# Patient Record
Sex: FEMALE | Race: WHITE | Employment: FULL TIME | URBAN - METROPOLITAN AREA
[De-identification: names, ages, dates, MRNs, and addresses within clinical notes are randomized per-mention and may not be internally consistent; named-entity substitution may affect disease eponyms.]

---

## 2022-04-25 DIAGNOSIS — M27.1 CENTRAL GIANT CELL REPARATIVE GRANULOMA OF JAW: Primary | ICD-10-CM

## 2022-04-25 DIAGNOSIS — Z11.59 ENCOUNTER FOR SCREENING FOR OTHER VIRAL DISEASES: Primary | ICD-10-CM

## 2022-05-02 DIAGNOSIS — M27.1 GIANT CELL GRANULOMA, CENTRAL: Primary | ICD-10-CM

## 2022-05-02 PROCEDURE — 99207 E-CONSULT TO ALLERGY & IMMUNOLOGY (ADULT OUTPT PROVIDER TO SPECIALIST WRITTEN QUESTION & RESPONSE): CPT | Performed by: ALLERGY & IMMUNOLOGY

## 2022-05-02 NOTE — H&P
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 Reports history of increasing swelling and hypoesthesia since her incisional biopsy on 4/1/2022. She notices increased mobility of her mandibular anterior teeth. Denies any other constitutional symptoms.    PMH:  - HLD  - Concern for JACKIE    PAST SURGICAL HISTORY:  - Salpingectomy 2018 for ectopic pregnancy  - Third molars extracted with sedation by outside dentist ~6 months ago  - No personal or family complications with general anesthesia    MEDICATIONS:  - Women's multivitamin  - Fish oil    ALLERGIES:  - Amoxicillin: rash as a baby  - Metals: Patient states that she develops rashes and swelling if she wears jewelry that isn't satinder metal. She reports that her grandmother has severe metal allergies, resulting in the need for a specialized implant for a hip arthroplasty.    SOCIAL HISTORY:  - Denies tobacco use.  - 3-5 alcoholic drinks/week.    REVIEW OF SYSTEMS:  ROS reviewed and negative aside from listed in HPI.    Physical activity tolerance > 4 METS. The patient is active, frequently runs ~3 miles.    VITALS: T:98.1  HR: 75  BP: 131/78  SPO2: 98%   H: 5'3\" (160.2 cm)  W: 160 lbs (72.6 kg)    PHYSICAL EXAM:  GEN: WD/WN female, NAD  HEENT: NC/AT, EOMI, PERRL. Distension of soft tissue overlying right anterior mandible, overlying skin is of normal temperature and color. Inferior border of mandible is intact. Right CNV-3 hypoesthesia extending from right lip commissure to midline. Neck soft with no palpable lymph nodes. Right mandibular anterior vestibule is firmly distended from midline to second premolar; non-tender; covered by normal-appearing mucosa. " Teeth #25 and #26 have class I mobility. LUTHER >45mm, OP clear, uvula midline, large pharyngeal tonsils (Aram III), fair oral hygiene, intact adult dentition. Floor of mouth is non-distended, no erythema/ulcerations/pigmentations/exophytic lesions.  CV: Warm and well-perfused, RRR, no murmurs/rubs/gallops.  PULM: Breathing comfortably on room air, CTAB, no rales/rhonchi/wheezing  GI: soft, ND/NT  MSK: SKINNER, no peripheral extremity edema  Neuro: AAOx4, CN II-XII intact bilaterally with exception of right CN V-3 hypoesthesia.    Mallampati I and normal neck mobility observed.    IMAGING:  OPG dated 2/17/2022 - imaging independently reviewed  Intact adult dentition, condyles seated in the glenoid fossae bilaterally, maxillary sinuses clear bilaterally. Radiolucent lesion of the right anterior mandible, contained within superior and inferior borders, involving roots of teeth #24, 25, 26, and 27.  New CBCT obtained today. Demonstrates growth of the lesion, which now extends posteriorly to involve tooth #28 and with deterioration of the buccal and lingual cortices of the mandible.    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    PLAN:    The patient is optimized for the surgical procedure, low risk for cardiovascular complications with general anesthesia (RCRI for low risk procedure: Class I Risk)    - Central giant cell granuloma: plan for enucleation and curettage, with possible extractions and ORIF in the OR with general anesthesia. The patient will also need the following labs prior to the procedure: parathyroid hormone, alkaline phosphatase, vitamin D, and calcium.   - Esthetic concerns: Intraoral scan made  today for the purpose of fabricating Essix retainer pending possible teeth extractions.  - Metal allergy and amoxicillin allergy: will refer patient for testing in allergy clinic.   - HLD: per patient, controlled with diet and weight loss.  - Possible JACKIE: patient was referred for sleep study with her general dentist, which she will obtain in the future.      Risks and benefits explained to pt including, but not limited to;   Pain, bleeding, swelling, infections, remaining tooth roots, nerve injury (permanent vs temporary), including no improvement or worsening of current paresthesia. Also discussed the possibility of lesion recurrence and the need for further surgical procedures.   Questions were invited and answered.      NV: 5/11/22 OR for enucleation and curettage of right mandibular anterior central giant cell granuloma, with possible extractions and ORIF    Taylor Chaudhary DDS  OMFS, PGY-1    Findings, assessment, and plan discussed with Dr. Barry.

## 2022-05-03 ENCOUNTER — E-CONSULT (OUTPATIENT)
Dept: ALLERGY | Facility: CLINIC | Age: 32
End: 2022-05-03
Payer: COMMERCIAL

## 2022-05-03 PROCEDURE — 99207 PR NO CHARGE NURSE ONLY: CPT | Performed by: ALLERGY & IMMUNOLOGY

## 2022-05-03 NOTE — PROGRESS NOTES
ALL SMARTFIELDS MUST BE COMPLETED FOR PATIENT CARE AND BILLING    5/3/2022     E-Consult has been denied due to: Complexity of question, needs in-person referral.    Interprofessional consultation requested by:  Taylor Chaudhary DDS      Clinical Question/Purpose: MY CLINICAL QUESTION IS: Can this patient tolerate penicillins and titanium? Had rash to amoxicillin as a baby. Has a history of reacting to certain jewelry with swelling and rash, has a family member who could not tolerate a traditional implant for a total hip arthroplasty due to metal allergy. Has upcoming surgery (5/11/2022) for enucleation of cyst of mandible, with possible placement of titanium plate for fixation. Penicillins (Augmentin, specifically) preferred for oneal-op antibiotics.    Patient assessment and information reviewed: Patient with history of metal allergy and penicillin allergy.      Recommendations: Recommend Dr. Wu at [facility] for metal testing.  Any allergy clinic able to do penicillin allergy testing.         The recommendations provided in this E-Consult are based on a review of clinical data pertinent to the clinical question presented, without a review of the patient's complete medical record or, the benefit of a comprehensive in-person or virtual patient evaluation. This consultation should not replace the clinical judgement and evaluation of the provider ordering this E-Consult. Any new clinical issues, or changes in patient status since the filing of this E-Consult will need to be taken into account when assessing these recommendations. Please contact me if you have further questions.    My total time spent reviewing clinical information and formulating assessment was 5 minutes.    Report sent automatically to requesting provider once signed.     Shanti RILEY MD

## 2022-05-05 ENCOUNTER — LAB (OUTPATIENT)
Dept: LAB | Facility: CLINIC | Age: 32
End: 2022-05-05
Payer: COMMERCIAL

## 2022-05-05 DIAGNOSIS — M27.1 GIANT CELL GRANULOMA, CENTRAL: ICD-10-CM

## 2022-05-05 PROCEDURE — 36415 COLL VENOUS BLD VENIPUNCTURE: CPT

## 2022-05-05 PROCEDURE — 83970 ASSAY OF PARATHORMONE: CPT

## 2022-05-05 PROCEDURE — 82306 VITAMIN D 25 HYDROXY: CPT

## 2022-05-05 PROCEDURE — 82310 ASSAY OF CALCIUM: CPT

## 2022-05-05 PROCEDURE — 84075 ASSAY ALKALINE PHOSPHATASE: CPT

## 2022-05-06 LAB
ALP SERPL-CCNC: 70 U/L (ref 40–150)
CALCIUM SERPL-MCNC: 9.2 MG/DL (ref 8.5–10.1)
DEPRECATED CALCIDIOL+CALCIFEROL SERPL-MC: 28 UG/L (ref 20–75)
PTH-INTACT SERPL-MCNC: 36 PG/ML (ref 18–80)

## 2022-05-08 ENCOUNTER — LAB (OUTPATIENT)
Dept: LAB | Facility: CLINIC | Age: 32
End: 2022-05-08
Attending: DENTIST
Payer: COMMERCIAL

## 2022-05-08 DIAGNOSIS — Z11.59 ENCOUNTER FOR SCREENING FOR OTHER VIRAL DISEASES: ICD-10-CM

## 2022-05-08 PROCEDURE — U0005 INFEC AGEN DETEC AMPLI PROBE: HCPCS

## 2022-05-09 LAB — SARS-COV-2 RNA RESP QL NAA+PROBE: NEGATIVE

## 2022-05-10 ENCOUNTER — TRANSCRIBE ORDERS (OUTPATIENT)
Dept: OTHER | Age: 32
End: 2022-05-10
Payer: COMMERCIAL

## 2022-05-10 DIAGNOSIS — T78.40XA ALLERGY, INITIAL ENCOUNTER: Primary | ICD-10-CM

## 2022-05-11 ENCOUNTER — ANESTHESIA EVENT (OUTPATIENT)
Dept: SURGERY | Facility: CLINIC | Age: 32
End: 2022-05-11
Payer: COMMERCIAL

## 2022-05-11 ENCOUNTER — HOSPITAL ENCOUNTER (OUTPATIENT)
Facility: CLINIC | Age: 32
Discharge: HOME | End: 2022-05-11
Attending: DENTIST | Admitting: DENTIST
Payer: COMMERCIAL

## 2022-05-11 ENCOUNTER — ANESTHESIA (OUTPATIENT)
Dept: SURGERY | Facility: CLINIC | Age: 32
End: 2022-05-11
Payer: COMMERCIAL

## 2022-05-11 VITALS
SYSTOLIC BLOOD PRESSURE: 124 MMHG | WEIGHT: 159.83 LBS | DIASTOLIC BLOOD PRESSURE: 72 MMHG | HEIGHT: 64 IN | TEMPERATURE: 98.7 F | BODY MASS INDEX: 27.29 KG/M2 | HEART RATE: 75 BPM | RESPIRATION RATE: 16 BRPM | OXYGEN SATURATION: 100 %

## 2022-05-11 DIAGNOSIS — M27.1 CENTRAL GIANT CELL REPARATIVE GRANULOMA OF JAW: Primary | ICD-10-CM

## 2022-05-11 DIAGNOSIS — M27.1 GIANT CELL GRANULOMA, CENTRAL: ICD-10-CM

## 2022-05-11 LAB
GLUCOSE BLDC GLUCOMTR-MCNC: 93 MG/DL (ref 70–99)
HCG UR QL: NEGATIVE

## 2022-05-11 PROCEDURE — 370N000017 HC ANESTHESIA TECHNICAL FEE, PER MIN: Performed by: DENTIST

## 2022-05-11 PROCEDURE — 258N000003 HC RX IP 258 OP 636: Performed by: ANESTHESIOLOGY

## 2022-05-11 PROCEDURE — 81025 URINE PREGNANCY TEST: CPT | Performed by: INTERNAL MEDICINE

## 2022-05-11 PROCEDURE — 272N000001 HC OR GENERAL SUPPLY STERILE: Performed by: DENTIST

## 2022-05-11 PROCEDURE — 88300 SURGICAL PATH GROSS: CPT | Mod: TC | Performed by: DENTIST

## 2022-05-11 PROCEDURE — 250N000025 HC SEVOFLURANE, PER MIN: Performed by: DENTIST

## 2022-05-11 PROCEDURE — 250N000009 HC RX 250: Performed by: ANESTHESIOLOGY

## 2022-05-11 PROCEDURE — 250N000009 HC RX 250: Performed by: DENTIST

## 2022-05-11 PROCEDURE — 250N000011 HC RX IP 250 OP 636: Performed by: DENTIST

## 2022-05-11 PROCEDURE — 88342 IMHCHEM/IMCYTCHM 1ST ANTB: CPT | Mod: 26 | Performed by: PATHOLOGY

## 2022-05-11 PROCEDURE — 360N000077 HC SURGERY LEVEL 4, PER MIN: Performed by: DENTIST

## 2022-05-11 PROCEDURE — 710N000012 HC RECOVERY PHASE 2, PER MINUTE: Performed by: DENTIST

## 2022-05-11 PROCEDURE — 250N000011 HC RX IP 250 OP 636: Performed by: ANESTHESIOLOGY

## 2022-05-11 PROCEDURE — 88300 SURGICAL PATH GROSS: CPT | Mod: 26 | Performed by: PATHOLOGY

## 2022-05-11 PROCEDURE — 82962 GLUCOSE BLOOD TEST: CPT

## 2022-05-11 PROCEDURE — 999N000141 HC STATISTIC PRE-PROCEDURE NURSING ASSESSMENT: Performed by: DENTIST

## 2022-05-11 PROCEDURE — 710N000010 HC RECOVERY PHASE 1, LEVEL 2, PER MIN: Performed by: DENTIST

## 2022-05-11 PROCEDURE — 250N000013 HC RX MED GY IP 250 OP 250 PS 637: Performed by: DENTIST

## 2022-05-11 PROCEDURE — 250N000013 HC RX MED GY IP 250 OP 250 PS 637: Performed by: ANESTHESIOLOGY

## 2022-05-11 PROCEDURE — 88307 TISSUE EXAM BY PATHOLOGIST: CPT | Mod: 26 | Performed by: PATHOLOGY

## 2022-05-11 RX ORDER — ONDANSETRON 2 MG/ML
4 INJECTION INTRAMUSCULAR; INTRAVENOUS EVERY 30 MIN PRN
Status: DISCONTINUED | OUTPATIENT
Start: 2022-05-11 | End: 2022-05-11 | Stop reason: HOSPADM

## 2022-05-11 RX ORDER — KETOROLAC TROMETHAMINE 30 MG/ML
INJECTION, SOLUTION INTRAMUSCULAR; INTRAVENOUS PRN
Status: DISCONTINUED | OUTPATIENT
Start: 2022-05-11 | End: 2022-05-11

## 2022-05-11 RX ORDER — MEPERIDINE HYDROCHLORIDE 25 MG/ML
12.5 INJECTION INTRAMUSCULAR; INTRAVENOUS; SUBCUTANEOUS
Status: DISCONTINUED | OUTPATIENT
Start: 2022-05-11 | End: 2022-05-11 | Stop reason: HOSPADM

## 2022-05-11 RX ORDER — DEXAMETHASONE SODIUM PHOSPHATE 4 MG/ML
INJECTION, SOLUTION INTRA-ARTICULAR; INTRALESIONAL; INTRAMUSCULAR; INTRAVENOUS; SOFT TISSUE PRN
Status: DISCONTINUED | OUTPATIENT
Start: 2022-05-11 | End: 2022-05-11

## 2022-05-11 RX ORDER — ACETAMINOPHEN 325 MG/1
650 TABLET ORAL EVERY 6 HOURS PRN
Qty: 56 TABLET | Refills: 0 | Status: SHIPPED | OUTPATIENT
Start: 2022-05-11 | End: 2022-05-18

## 2022-05-11 RX ORDER — AZITHROMYCIN 250 MG/1
TABLET, FILM COATED ORAL
Qty: 6 TABLET | Refills: 0 | Status: SHIPPED | OUTPATIENT
Start: 2022-05-11 | End: 2022-05-16

## 2022-05-11 RX ORDER — ONDANSETRON 4 MG/1
4 TABLET, ORALLY DISINTEGRATING ORAL EVERY 30 MIN PRN
Status: DISCONTINUED | OUTPATIENT
Start: 2022-05-11 | End: 2022-05-11 | Stop reason: HOSPADM

## 2022-05-11 RX ORDER — LIDOCAINE HYDROCHLORIDE 20 MG/ML
INJECTION, SOLUTION INFILTRATION; PERINEURAL PRN
Status: DISCONTINUED | OUTPATIENT
Start: 2022-05-11 | End: 2022-05-11

## 2022-05-11 RX ORDER — CHLORHEXIDINE GLUCONATE ORAL RINSE 1.2 MG/ML
15 SOLUTION DENTAL 2 TIMES DAILY
Qty: 300 ML | Refills: 0 | Status: SHIPPED | OUTPATIENT
Start: 2022-05-11 | End: 2022-05-21

## 2022-05-11 RX ORDER — IBUPROFEN 400 MG/1
400 TABLET, FILM COATED ORAL ONCE
Status: DISCONTINUED | OUTPATIENT
Start: 2022-05-11 | End: 2022-05-11 | Stop reason: HOSPADM

## 2022-05-11 RX ORDER — BUPIVACAINE HYDROCHLORIDE AND EPINEPHRINE 2.5; 5 MG/ML; UG/ML
INJECTION, SOLUTION INFILTRATION; PERINEURAL PRN
Status: DISCONTINUED | OUTPATIENT
Start: 2022-05-11 | End: 2022-05-11 | Stop reason: HOSPADM

## 2022-05-11 RX ORDER — OXYCODONE HYDROCHLORIDE 5 MG/1
5 TABLET ORAL EVERY 6 HOURS PRN
Qty: 10 TABLET | Refills: 0 | Status: SHIPPED | OUTPATIENT
Start: 2022-05-11 | End: 2022-05-14

## 2022-05-11 RX ORDER — ACETAMINOPHEN 325 MG/1
975 TABLET ORAL ONCE
Status: DISCONTINUED | OUTPATIENT
Start: 2022-05-11 | End: 2022-05-11 | Stop reason: HOSPADM

## 2022-05-11 RX ORDER — CEFAZOLIN SODIUM/WATER 2 G/20 ML
2 SYRINGE (ML) INTRAVENOUS
Status: COMPLETED | OUTPATIENT
Start: 2022-05-11 | End: 2022-05-11

## 2022-05-11 RX ORDER — SODIUM CHLORIDE, SODIUM LACTATE, POTASSIUM CHLORIDE, CALCIUM CHLORIDE 600; 310; 30; 20 MG/100ML; MG/100ML; MG/100ML; MG/100ML
INJECTION, SOLUTION INTRAVENOUS CONTINUOUS PRN
Status: DISCONTINUED | OUTPATIENT
Start: 2022-05-11 | End: 2022-05-11

## 2022-05-11 RX ORDER — ONDANSETRON 2 MG/ML
INJECTION INTRAMUSCULAR; INTRAVENOUS PRN
Status: DISCONTINUED | OUTPATIENT
Start: 2022-05-11 | End: 2022-05-11

## 2022-05-11 RX ORDER — ACETAMINOPHEN 325 MG/10.15ML
1000 LIQUID ORAL ONCE
Status: COMPLETED | OUTPATIENT
Start: 2022-05-11 | End: 2022-05-11

## 2022-05-11 RX ORDER — OXYCODONE HYDROCHLORIDE 5 MG/1
5 TABLET ORAL EVERY 4 HOURS PRN
Status: DISCONTINUED | OUTPATIENT
Start: 2022-05-11 | End: 2022-05-11 | Stop reason: HOSPADM

## 2022-05-11 RX ORDER — SODIUM CHLORIDE, SODIUM LACTATE, POTASSIUM CHLORIDE, CALCIUM CHLORIDE 600; 310; 30; 20 MG/100ML; MG/100ML; MG/100ML; MG/100ML
INJECTION, SOLUTION INTRAVENOUS CONTINUOUS
Status: DISCONTINUED | OUTPATIENT
Start: 2022-05-11 | End: 2022-05-11 | Stop reason: HOSPADM

## 2022-05-11 RX ORDER — OXYMETAZOLINE HYDROCHLORIDE 0.05 G/100ML
SPRAY NASAL PRN
Status: DISCONTINUED | OUTPATIENT
Start: 2022-05-11 | End: 2022-05-11

## 2022-05-11 RX ORDER — HYDROMORPHONE HYDROCHLORIDE 1 MG/ML
0.4 INJECTION, SOLUTION INTRAMUSCULAR; INTRAVENOUS; SUBCUTANEOUS EVERY 5 MIN PRN
Status: DISCONTINUED | OUTPATIENT
Start: 2022-05-11 | End: 2022-05-11 | Stop reason: HOSPADM

## 2022-05-11 RX ORDER — IBUPROFEN 600 MG/1
600 TABLET, FILM COATED ORAL EVERY 6 HOURS PRN
Qty: 28 TABLET | Refills: 0 | Status: SHIPPED | OUTPATIENT
Start: 2022-05-11 | End: 2022-05-18

## 2022-05-11 RX ORDER — FENTANYL CITRATE 50 UG/ML
INJECTION, SOLUTION INTRAMUSCULAR; INTRAVENOUS PRN
Status: DISCONTINUED | OUTPATIENT
Start: 2022-05-11 | End: 2022-05-11

## 2022-05-11 RX ORDER — FENTANYL CITRATE 50 UG/ML
25 INJECTION, SOLUTION INTRAMUSCULAR; INTRAVENOUS
Status: DISCONTINUED | OUTPATIENT
Start: 2022-05-11 | End: 2022-05-11 | Stop reason: HOSPADM

## 2022-05-11 RX ORDER — PROPOFOL 10 MG/ML
INJECTION, EMULSION INTRAVENOUS PRN
Status: DISCONTINUED | OUTPATIENT
Start: 2022-05-11 | End: 2022-05-11

## 2022-05-11 RX ORDER — CHLORHEXIDINE GLUCONATE ORAL RINSE 1.2 MG/ML
10 SOLUTION DENTAL ONCE
Status: COMPLETED | OUTPATIENT
Start: 2022-05-11 | End: 2022-05-11

## 2022-05-11 RX ORDER — CEFAZOLIN SODIUM/WATER 2 G/20 ML
2 SYRINGE (ML) INTRAVENOUS SEE ADMIN INSTRUCTIONS
Status: DISCONTINUED | OUTPATIENT
Start: 2022-05-11 | End: 2022-05-11 | Stop reason: HOSPADM

## 2022-05-11 RX ORDER — ACETAMINOPHEN 325 MG/1
975 TABLET ORAL ONCE
Status: DISCONTINUED | OUTPATIENT
Start: 2022-05-11 | End: 2022-05-11

## 2022-05-11 RX ORDER — DIPHENHYDRAMINE HYDROCHLORIDE 50 MG/ML
INJECTION INTRAMUSCULAR; INTRAVENOUS PRN
Status: DISCONTINUED | OUTPATIENT
Start: 2022-05-11 | End: 2022-05-11

## 2022-05-11 RX ORDER — DEXMEDETOMIDINE HYDROCHLORIDE 4 UG/ML
INJECTION, SOLUTION INTRAVENOUS PRN
Status: DISCONTINUED | OUTPATIENT
Start: 2022-05-11 | End: 2022-05-11

## 2022-05-11 RX ORDER — FENTANYL CITRATE 50 UG/ML
50 INJECTION, SOLUTION INTRAMUSCULAR; INTRAVENOUS EVERY 5 MIN PRN
Status: DISCONTINUED | OUTPATIENT
Start: 2022-05-11 | End: 2022-05-11 | Stop reason: HOSPADM

## 2022-05-11 RX ADMIN — SUGAMMADEX 200 MG: 100 INJECTION, SOLUTION INTRAVENOUS at 16:20

## 2022-05-11 RX ADMIN — ROCURONIUM BROMIDE 50 MG: 50 INJECTION, SOLUTION INTRAVENOUS at 14:18

## 2022-05-11 RX ADMIN — DIPHENHYDRAMINE HYDROCHLORIDE 25 MG: 50 INJECTION, SOLUTION INTRAMUSCULAR; INTRAVENOUS at 14:23

## 2022-05-11 RX ADMIN — FENTANYL CITRATE 50 MCG: 50 INJECTION INTRAMUSCULAR; INTRAVENOUS at 17:00

## 2022-05-11 RX ADMIN — DEXAMETHASONE SODIUM PHOSPHATE 10 MG: 4 INJECTION, SOLUTION INTRA-ARTICULAR; INTRALESIONAL; INTRAMUSCULAR; INTRAVENOUS; SOFT TISSUE at 14:39

## 2022-05-11 RX ADMIN — CHLORHEXIDINE GLUCONATE 10 ML: 1.2 SOLUTION ORAL at 09:48

## 2022-05-11 RX ADMIN — OXYMETAZOLINE HYDROCHLORIDE 2 SPRAY: 0.05 SPRAY NASAL at 14:07

## 2022-05-11 RX ADMIN — DEXMEDETOMIDINE 8 MCG: 100 INJECTION, SOLUTION, CONCENTRATE INTRAVENOUS at 16:15

## 2022-05-11 RX ADMIN — ACETAMINOPHEN 1000 MG: 325 SOLUTION ORAL at 17:40

## 2022-05-11 RX ADMIN — Medication 2 G: at 14:20

## 2022-05-11 RX ADMIN — FENTANYL CITRATE 50 MCG: 50 INJECTION, SOLUTION INTRAMUSCULAR; INTRAVENOUS at 14:51

## 2022-05-11 RX ADMIN — FENTANYL CITRATE 50 MCG: 50 INJECTION INTRAMUSCULAR; INTRAVENOUS at 16:40

## 2022-05-11 RX ADMIN — FENTANYL CITRATE 200 MCG: 50 INJECTION, SOLUTION INTRAMUSCULAR; INTRAVENOUS at 14:17

## 2022-05-11 RX ADMIN — KETOROLAC TROMETHAMINE 30 MG: 30 INJECTION, SOLUTION INTRAMUSCULAR at 16:11

## 2022-05-11 RX ADMIN — PROPOFOL 200 MG: 10 INJECTION, EMULSION INTRAVENOUS at 14:17

## 2022-05-11 RX ADMIN — ONDANSETRON 4 MG: 2 INJECTION INTRAMUSCULAR; INTRAVENOUS at 15:45

## 2022-05-11 RX ADMIN — FENTANYL CITRATE 50 MCG: 50 INJECTION, SOLUTION INTRAMUSCULAR; INTRAVENOUS at 16:02

## 2022-05-11 RX ADMIN — MIDAZOLAM 2 MG: 1 INJECTION INTRAMUSCULAR; INTRAVENOUS at 14:07

## 2022-05-11 RX ADMIN — SODIUM CHLORIDE, POTASSIUM CHLORIDE, SODIUM LACTATE AND CALCIUM CHLORIDE: 600; 310; 30; 20 INJECTION, SOLUTION INTRAVENOUS at 14:07

## 2022-05-11 NOTE — DISCHARGE INSTRUCTIONS
Swelling  Swelling occurs after jaw surgery. To minimize keep head elevated at least 30 degrees. Once swelling is present it can remain for 2 weeks. You should use ice packs on your cheeks for 20 minutes on, 20 minutes off for the first 2-3 days to help minimize swelling. After that time, you should use heat (30 minutes on, 30 minutes off) to help continue to reduce swelling.    Bleeding  A small amount of bleeding is expected for up to 24 hours. Excessive bleeding is not normal, please contact the Oral and Maxillofacial Surgery Clinic or the Oral and Maxillofacial Surgery Resident on call should this occur.     Nausea  Nausea is common after jaw surgery. It may be reduced by taking small amount food with pain medication. Drink large amounts of water when taking pain medications. Call clinic if repeated vomitting is a problem.     Oral Hygiene   Oral hygiene should be resumed 24 hours after jaw surgery, brushing and rinsing mouth.  After jaw surgery you may have a splint wired to your teeth, which will allow plaque accumulation. Therefore, maintaining a clean mouth is essential. Brush gently, taking care not to disturb the wounds. Occasional bleeding and discomfort following tooth brushing is common.   Use the prescribed chlorhexidine mouth rinse twice a day as instructed.   Rinse with warm salt water 3 times a day or after meals.    Activity Restriction  No lifting > 10-15 pounds for 4 weeks following surgery.   No strenuous activity or exercise for 4-6 weeks after surgery or as instructed otherwise.  No swimming or submerging head/wounds under water until approved following surgery, at least 4 weeks.  No driving while taking narcotic pain medication    Diet  Soft, non-chew diet for 6 week. Meal supplements such age Boost and Ensure are helpful.    When to call  If extreme increase in swelling, extensive discharge in mouth bloody or purulent, severe fevers/chills (above 101.4), please call Oral & Maxillofacial  Surgery during business hours at [phone] or call [facility]  and ask for Oral & Maxillofacial Surgery resident on call after hours. If greatly concerned, please present to the [facility] emergency. Phone number: [phone]

## 2022-05-11 NOTE — ANESTHESIA CARE TRANSFER NOTE
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    Anesthesia Type:   General     Note:    Oropharynx: oropharynx clear of all foreign objects and spontaneously breathing  Level of Consciousness: drowsy  Oxygen Supplementation: nasal cannula  Level of Supplemental Oxygen (L/min / FiO2): 2  Independent Airway: airway patency satisfactory and stable  Dentition: dentition unchanged  Vital Signs Stable: post-procedure vital signs reviewed and stable  Report to RN Given: handoff report given  Patient transferred to: PACU    Handoff Report: Identifed the Patient, Identified the Reponsible Provider, Reviewed the pertinent medical history, Discussed the surgical course, Reviewed Intra-OP anesthesia mangement and issues during anesthesia, Set expectations for post-procedure period and Allowed opportunity for questions and acknowledgement of understanding      Vitals:  Vitals Value Taken Time   /81 05/11/22 1628   Temp     Pulse 77 05/11/22 1633   Resp     SpO2 100 % 05/11/22 1633   Vitals shown include unvalidated device data.    Electronically Signed By: SUHA Newell CRNA  May 11, 2022  4:34 PM

## 2022-05-11 NOTE — ANESTHESIA POSTPROCEDURE EVALUATION
Patient: Salvador Ashraf    Procedure: Procedure(s):  Enucleation and curettage with peripheral ostectomy right mandibular central giant cell granuloma, extraction of teeth #24, 25, 26, 27 and decompression of right mental nerve  SURGICAL EXTRACTION, TEETH       Anesthesia Type:  General    Note:  Disposition: Outpatient   Postop Pain Control: Uneventful            Sign Out: Well controlled pain   PONV: No   Neuro/Psych: Uneventful            Sign Out: Acceptable/Baseline neuro status   Airway/Respiratory: Uneventful            Sign Out: Acceptable/Baseline resp. status   CV/Hemodynamics: Uneventful            Sign Out: Acceptable CV status; No obvious hypovolemia; No obvious fluid overload   Other NRE: NONE   DID A NON-ROUTINE EVENT OCCUR? No           Last vitals:  Vitals Value Taken Time   /89 05/11/22 1730   Temp 37.2  C (99  F) 05/11/22 1630   Pulse 80 05/11/22 1734   Resp 8 05/11/22 1734   SpO2 98 % 05/11/22 1734   Vitals shown include unvalidated device data.    Electronically Signed By: Josue Mejia MD  May 11, 2022  5:35 PM

## 2022-05-11 NOTE — ANESTHESIA PROCEDURE NOTES
Airway       Patient location during procedure: OR       Procedure Start/Stop Times: 5/11/2022 2:21 PM  Staff -        Other Anesthesia Staff: Parisa Ramirez RN       Performed By: HOLLIE and with CRNAs       Procedure performed by resident/fellow/CRNA in presence of a teaching physician.    Consent for Airway        Urgency: elective  Indications and Patient Condition       Indications for airway management: oneal-procedural       Induction type:intravenous       Mask difficulty assessment: 1 - vent by mask    Final Airway Details       Final airway type: endotracheal airway       Successful airway: Nasal and OBIE  Endotracheal Airway Details        ETT size (mm): 7.0       Cuffed: yes       Cuff volume (mL): 7       Successful intubation technique: video laryngoscopy and direct laryngoscopy       VL Blade Size: MAC 3       Grade View of Cords: 1       Adjucts: magill forceps       Position: Right       Measured from: nares       Secured at (cm): 26       Bite block used: None    Post intubation assessment        Placement verified by: capnometry, equal breath sounds and chest rise        Number of attempts at approach: 1       Number of other approaches attempted: 0       Secured with: silk tape and other (comment) (taped by surgery)       Ease of procedure: easy       Dentition: Intact and Unchanged    Medication(s) Administered   Medication Administration Time: 5/11/2022 2:21 PM

## 2022-05-11 NOTE — OP NOTE
ORAL & MAXILLOFACIAL SURGERY  OPERATIVE REPORT      PATIENT: Salvador Ashraf   MRN: [id number]  : 1990    DATE OF SERVICE:  22       STAFF SURGEON(S):    Marva Barry DDS    RESIDENT SURGEON(S):    Chung Campoverde DMD    (S):    Chel Younger DDS    PREOPERATIVE DIAGNOSIS:    1. 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 Enucleation and curettage of right anterior mandible central giant cell granuloma with peripheral ostectomy of bony cavity  2. Simple extraction teeth #24, 25, 26, 27  3. Fabrication of provisional essex dental retainer    ANESTHESIA:    General via nasal ETT    LOCAL ANESTHESIA:    0.25% bupivacaine (1:200,000 epi) x 10 ml    ANTIBIOTICS:  Cefazolin 2 g IV perioperative dose    INDICATIONS FOR OPERATION  32-year-old female with a history of progressively expanding lesion of the right anterior mandible.  Biopsy diagnosis of central giant cell granuloma of the mandible following incisional biopsy with the [facility] service.  Given aggressive nature and rapid expansion, indication for treatment in the OR setting.    DESCRIPTION OF PROCEDURE:    Patient was met in the preoperative holding area.  Informed consent was obtained and site was marked on designated diagram.  The patient was then evaluated by Anesthesia Team and brought to the operating room.  The patient was induced under general anesthesia  Following intubation, the patient was turned over to the OMFS Team.  The patient was then prepped and draped in sterile fashion.  Preoperative timeout was performed by all the individuals in the operating room according to [facility] policy.  The oropharynx was then suctioned and rinsed with a 0.12% chlorhexidine solution.  An oropharyngeal throat pack was placed.    Local anesthetic administered local infiltration of the anterior  mandible buccal vestibule.  Bovie electrocautery was used to incise through gingival mucosa and mentalis muscle to the depth of bone at the midline of the mandible.  Mucoperiosteal elevator was used for blunt subperiosteal dissection laterally along the right mandible posteriorly above the lesion and mental nerve.  Bovie electrocautery was then used to extend the buccal vestibular incision along the posterior right mandible.  Mucoperiosteal elevator was used to further blunt dissection to fully expose the expansile right mandible lesion and to dissect out to the right mental nerve.  Curettes were used to carefully enucleate the lesion from the right mandible, specimen was placed in formalin container labeled with patient demographic data to be sent for surgical pathology.  There was noted perforation of the buccal and lingual cortices of the right anterior mandible parasymphyseal region.  Teeth #24, 25, 26, 27 were noted to be grossly mobile due to lack of bony support secondary to the lesion.  Teeth #24, 25, 26, 27 were delivered in a simple fashion using straight elevator luxation and forceps delivery.  The right mental nerve was decompressed from the bony foramen to facilitate retraction.  Surgical handpiece with pineapple shaped bur under copious saline irrigation used to perform peripheral ostectomy of the bony cavity.  Site was then irrigated with copious sterile saline and inspected, no residual tumor fragments were visible.  15 x 20mm cross-linked collagen membrane was cut into an oval and then placed along the right anterior mandible lingual cortex perforation and Gelfoam was packed into the bony cavity.  4-0 Vicryl suture was used to resuspend the mentalis muscle bilaterally.  4-0 Vicryl suture was also used for primary closure of gingival mucosa at extraction sites #24, 25, 26, 27.  The buccal vestibular gingival mucosa was then closed primarily with a running 3-0 Chromic Gut suture.    Attention was  turned to the provisional essex retainer.  A preformed thermoplastic mold that was prefabricated using a 3D printed model of the mandibular dentition was used to create the provisional essex retainer.  Light-cure flowable composite was used to fill the spaces where teeth #24, 25, 26, 27 were extracted.  Surgical handpiece with fissure bur was used to contour the composite and reduce any interferences with the adjacent dentition.  The provisional essex retainer was then tried in to ensure passive fit and then placed into bag to be sent home with patient.    The oral cavity was then irrigated with copious sterile saline and then suctioned.   The oropharyngeal throat pack was removed.  The patient was then turned over to the Anesthesia Team.  All needle, Ray-Ngozi and sponge counts were found to be correct x2 at conclusion of the procedure.    ESTIMATED BLOOD LOSS:    50 ml    SPECIMENS:  ID Type Source Tests Collected by Time Destination   1 : right mandible tumor Tissue Other SURGICAL PATHOLOGY EXAM Marva Barry DDS 5/11/2022  2:59 PM    2 : teeth Tissue Other SURGICAL PATHOLOGY EXAM Marva Barry DDS 5/11/2022  3:15 PM      IMPLANTS:  None    COMPLICATIONS:    None     DISPOSITION:    Stable; transfer to PACU followed by discharge to home       The staff surgeon, Dr. Marva Barry was present for all critical portions of the procedure.      Chung Campoverde, NAYLA  Oral & Maxillofacial Surgery, PGY-4

## 2022-05-11 NOTE — BRIEF OP NOTE
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]  Post-operative diagnosis Same as pre-operative diagnosis    Procedure: Procedure(s):  Enucleation and curettage with peripheral ostectomy right mandibular central giant cell granuloma, extraction of teeth #24, 25, 26, 27 and decompression of right mental nerve  SURGICAL EXTRACTION, TEETH  Surgeon: Surgeon(s) and Role:     * Marva Barry DDS - Primary     * Chung Campoverde DMD - Resident - Assisting     * Chel Younger DDS - Resident - Assisting  Anesthesia: General   Estimated Blood Loss: 50 mL from 5/11/2022  2:07 PM to 5/11/2022  4:27 PM      Drains: None  Specimens:   ID Type Source Tests Collected by Time Destination   1 : right mandible tumor Tissue Other SURGICAL PATHOLOGY EXAM Marva Barry DDS 5/11/2022  2:59 PM    2 : teeth Tissue Other SURGICAL PATHOLOGY EXAM Marva Barry DDS 5/11/2022  3:15 PM      Findings:   See operative report.  Complications: None.  Implants: * No implants in log *    Chel Younger DDS  Oral & Maxillofacial Surgery PGY-3

## 2022-05-18 LAB
PATH REPORT.COMMENTS IMP SPEC: NORMAL
PATH REPORT.FINAL DX SPEC: NORMAL
PATH REPORT.GROSS SPEC: NORMAL
PATH REPORT.MICROSCOPIC SPEC OTHER STN: NORMAL
PATH REPORT.RELEVANT HX SPEC: NORMAL
PHOTO IMAGE: NORMAL

## 2022-06-11 ENCOUNTER — HEALTH MAINTENANCE LETTER (OUTPATIENT)
Age: 32
End: 2022-06-11

## 2022-10-16 ENCOUNTER — HEALTH MAINTENANCE LETTER (OUTPATIENT)
Age: 32
End: 2022-10-16

## 2023-06-17 ENCOUNTER — HEALTH MAINTENANCE LETTER (OUTPATIENT)
Age: 33
End: 2023-06-17

## 2023-08-24 NOTE — ANESTHESIA PREPROCEDURE EVALUATION
Anesthesia Pre-Procedure Evaluation    Patient: Salvador Ashraf   MRN: [id number] : 1990        Procedure : Procedure(s):  EXCISION, CYST, MANDIBLE  possible SURGICAL EXTRACTION, TEETH  possible OPEN REDUCTION INTERNAL FIXATION, FRACTURE, MANDIBLE          No past medical history on file.   No past surgical history on file.   Allergies   Allergen Reactions     Amoxicillin       Social History     Tobacco Use     Smoking status: Never Smoker     Smokeless tobacco: Never Used   Substance Use Topics     Alcohol use: Yes      Wt Readings from Last 1 Encounters:   No data found for Wt        Anesthesia Evaluation   Pt has had prior anesthetic.     No history of anesthetic complications       ROS/MED HX  ENT/Pulmonary: Comment: Giant cell granuloma anterior R mandible      Neurologic:  - neg neurologic ROS     Cardiovascular:  - neg cardiovascular ROS     METS/Exercise Tolerance: 4 - Raking leaves, gardening    Hematologic:  - neg hematologic  ROS     Musculoskeletal:  - neg musculoskeletal ROS     GI/Hepatic:  - neg GI/hepatic ROS     Renal/Genitourinary:  - neg Renal ROS     Endo:  - neg endo ROS     Psychiatric/Substance Use:  - neg psychiatric ROS     Infectious Disease:  - neg infectious disease ROS     Malignancy:  - neg malignancy ROS     Other:            Physical Exam    Airway        Mallampati: II   TM distance: > 3 FB   Neck ROM: full   Mouth opening: > 3 cm    Respiratory Devices and Support         Dental  no notable dental history         Cardiovascular   cardiovascular exam normal       Rhythm and rate: regular and normal     Pulmonary   pulmonary exam normal        breath sounds clear to auscultation           OUTSIDE LABS:  CBC: No results found for: WBC, HGB, HCT, PLT  BMP: No results found for: NA, POTASSIUM, CHLORIDE, CO2, BUN, CR, GLC  COAGS: No results found for: PTT, INR, FIBR  POC: No results found for: BGM, HCG, HCGS  HEPATIC:   Lab Results   Component Value Date    ALKPHOS 70  05/05/2022     OTHER:   Lab Results   Component Value Date    LEÓN 9.2 05/05/2022       Anesthesia Plan    ASA Status:  2   NPO Status:  NPO Appropriate    Anesthesia Type: General.     - Airway: ETT   Induction: Intravenous.   Maintenance: Balanced.        Consents    Anesthesia Plan(s) and associated risks, benefits, and realistic alternatives discussed. Questions answered and patient/representative(s) expressed understanding.    - Discussed:     - Discussed with:  Patient      - Extended Intubation/Ventilatory Support Discussed: No.      - Patient is DNR/DNI Status: No    Use of blood products discussed: No .     Postoperative Care    Pain management: IV analgesics.   PONV prophylaxis: Ondansetron (or other 5HT-3), Dexamethasone or Solumedrol     Comments:                John Ragsdale DO   none

## 2024-08-10 ENCOUNTER — HEALTH MAINTENANCE LETTER (OUTPATIENT)
Age: 34
End: 2024-08-10

## (undated) DEVICE — ESU NDL COLORADO MICRO E1651

## (undated) DEVICE — SOL NACL 0.9% IRRIG 1000ML BOTTLE 2F7124

## (undated) DEVICE — BUR STRK SIDE CUT 1.6X5.1X44.8MM CARBIDE 6FLUTE 1607-002-107

## (undated) DEVICE — GLOVE PROTEXIS BLUE W/NEU-THERA 7.0  2D73EB70

## (undated) DEVICE — SUCTION MANIFOLD NEPTUNE 2 SYS 4 PORT 0702-020-000

## (undated) DEVICE — GLOVE PROTEXIS W/NEU-THERA 6.5  2D73TE65

## (undated) DEVICE — SU VICRYL 3-0 X-1 27" UND J458H

## (undated) DEVICE — DRSG TELFA 3X8" 1238

## (undated) DEVICE — STPL SKIN 35W ROTATING HEAD PRW35

## (undated) DEVICE — ESU GROUND PAD ADULT W/CORD E7507

## (undated) DEVICE — ADH LIQUID MASTISOL TOPICAL VIAL 2-3ML 0523-48

## (undated) DEVICE — SOL WATER IRRIG 1000ML BOTTLE 2F7114

## (undated) DEVICE — DRSG GAUZE 4X4" TRAY 6939

## (undated) DEVICE — PACK NEURO MINOR UMMC SNE32MNMU4

## (undated) DEVICE — LINEN TOWEL PACK X6 WHITE 5487

## (undated) DEVICE — TOOTHBRUSH ADULT NON STERILE MDS136850

## (undated) DEVICE — PAD CHUX UNDERPAD 23X24" 7136

## (undated) DEVICE — SPONGE SURGIFOAM 100 1974

## (undated) DEVICE — Device

## (undated) DEVICE — SU CHROMIC 3-0 FS-2 27" 636

## (undated) DEVICE — PREP POVIDONE IODINE SOLUTION 10% 4OZ BOTTLE 29906-004

## (undated) DEVICE — BRUSH SURGICAL EZ SCRUB PLAIN 371603

## (undated) DEVICE — STRAP UNIVERSAL POSITIONING 2-PIECE 4X47X76" 91-287

## (undated) RX ORDER — ROCURONIUM BROMIDE 50 MG/5 ML
SYRINGE (ML) INTRAVENOUS
Status: DISPENSED
Start: 2022-05-11

## (undated) RX ORDER — CEFAZOLIN SODIUM 1 G/3ML
INJECTION, POWDER, FOR SOLUTION INTRAMUSCULAR; INTRAVENOUS
Status: DISPENSED
Start: 2022-05-11

## (undated) RX ORDER — FENTANYL CITRATE 50 UG/ML
INJECTION, SOLUTION INTRAMUSCULAR; INTRAVENOUS
Status: DISPENSED
Start: 2022-05-11

## (undated) RX ORDER — ONDANSETRON 2 MG/ML
INJECTION INTRAMUSCULAR; INTRAVENOUS
Status: DISPENSED
Start: 2022-05-11

## (undated) RX ORDER — PROPOFOL 10 MG/ML
INJECTION, EMULSION INTRAVENOUS
Status: DISPENSED
Start: 2022-05-11

## (undated) RX ORDER — CEFAZOLIN SODIUM/WATER 2 G/20 ML
SYRINGE (ML) INTRAVENOUS
Status: DISPENSED
Start: 2022-05-11

## (undated) RX ORDER — KETOROLAC TROMETHAMINE 30 MG/ML
INJECTION, SOLUTION INTRAMUSCULAR; INTRAVENOUS
Status: DISPENSED
Start: 2022-05-11

## (undated) RX ORDER — BUPIVACAINE HYDROCHLORIDE AND EPINEPHRINE 2.5; 5 MG/ML; UG/ML
INJECTION, SOLUTION INFILTRATION; PERINEURAL
Status: DISPENSED
Start: 2022-05-11

## (undated) RX ORDER — OXYMETAZOLINE HYDROCHLORIDE 0.05 G/100ML
SPRAY NASAL
Status: DISPENSED
Start: 2022-05-11

## (undated) RX ORDER — DIPHENHYDRAMINE HYDROCHLORIDE 50 MG/ML
INJECTION INTRAMUSCULAR; INTRAVENOUS
Status: DISPENSED
Start: 2022-05-11

## (undated) RX ORDER — CHLORHEXIDINE GLUCONATE ORAL RINSE 1.2 MG/ML
SOLUTION DENTAL
Status: DISPENSED
Start: 2022-05-11

## (undated) RX ORDER — FENTANYL CITRATE-0.9 % NACL/PF 10 MCG/ML
PLASTIC BAG, INJECTION (ML) INTRAVENOUS
Status: DISPENSED
Start: 2022-05-11

## (undated) RX ORDER — LIDOCAINE HYDROCHLORIDE 20 MG/ML
INJECTION, SOLUTION EPIDURAL; INFILTRATION; INTRACAUDAL; PERINEURAL
Status: DISPENSED
Start: 2022-05-11

## (undated) RX ORDER — ACETAMINOPHEN 325 MG/10.15ML
LIQUID ORAL
Status: DISPENSED
Start: 2022-05-11